# Patient Record
Sex: MALE | Race: WHITE | URBAN - METROPOLITAN AREA
[De-identification: names, ages, dates, MRNs, and addresses within clinical notes are randomized per-mention and may not be internally consistent; named-entity substitution may affect disease eponyms.]

---

## 2017-05-01 ENCOUNTER — OFFICE VISIT (OUTPATIENT)
Dept: DERMATOLOGY | Age: 82
End: 2017-05-01

## 2017-05-01 DIAGNOSIS — L57.0 AK (ACTINIC KERATOSIS): Primary | ICD-10-CM

## 2017-05-01 DIAGNOSIS — R23.8 VENOUS LAKE: ICD-10-CM

## 2017-05-01 DIAGNOSIS — L82.1 SEBORRHEIC KERATOSIS: ICD-10-CM

## 2017-05-01 DIAGNOSIS — D22.9 MULTIPLE NEVI: ICD-10-CM

## 2017-05-01 PROCEDURE — 17003 DESTRUCT PREMALG LES 2-14: CPT | Performed by: DERMATOLOGY

## 2017-05-01 PROCEDURE — 17000 DESTRUCT PREMALG LESION: CPT | Performed by: DERMATOLOGY

## 2017-05-01 PROCEDURE — 99213 OFFICE O/P EST LOW 20 MIN: CPT | Performed by: DERMATOLOGY

## 2017-05-01 RX ORDER — HYDROCODONE BITARTRATE AND ACETAMINOPHEN 5; 325 MG/1; MG/1
1 TABLET ORAL EVERY 6 HOURS PRN
COMMUNITY

## 2017-05-01 RX ORDER — CYCLOBENZAPRINE HCL 5 MG
5 TABLET ORAL 3 TIMES DAILY PRN
COMMUNITY

## 2018-01-08 ENCOUNTER — TELEPHONE (OUTPATIENT)
Dept: DERMATOLOGY | Age: 83
End: 2018-01-08

## 2018-01-08 NOTE — TELEPHONE ENCOUNTER
Patient called and needs to get in soon. He has white spots on is hands and they are itchy.     Call back # [phone]

## 2018-01-11 ENCOUNTER — OFFICE VISIT (OUTPATIENT)
Dept: DERMATOLOGY | Age: 83
End: 2018-01-11

## 2018-01-11 DIAGNOSIS — L57.0 AK (ACTINIC KERATOSIS): Primary | ICD-10-CM

## 2018-01-11 PROCEDURE — 17000 DESTRUCT PREMALG LESION: CPT | Performed by: DERMATOLOGY

## 2018-01-11 PROCEDURE — 17003 DESTRUCT PREMALG LES 2-14: CPT | Performed by: DERMATOLOGY

## 2018-01-11 NOTE — PROGRESS NOTES
Allergies      Physical Examination     Gen, NAD  The following were examined and determined to be normal: Psych/Neuro  The following were examined and determined to be abnormal: RUE, LUE  Dorsum of the hands with erythematous roughly scaled macules    Assessment and Plan     1. AK's - Dorsum of the hands  - 8 lesion(s) on the hands treated with liquid nitrogen x 2 cycles. Patient educated on risk of blister, hypopigmentation/scar and wound care. - Sun protection    Follow-up in May as planned for full skin check.

## 2018-05-07 ENCOUNTER — OFFICE VISIT (OUTPATIENT)
Dept: DERMATOLOGY | Age: 83
End: 2018-05-07

## 2018-05-07 DIAGNOSIS — L57.0 AK (ACTINIC KERATOSIS): Primary | ICD-10-CM

## 2018-05-07 DIAGNOSIS — D22.9 MULTIPLE NEVI: ICD-10-CM

## 2018-05-07 DIAGNOSIS — L82.1 SEBORRHEIC KERATOSIS: ICD-10-CM

## 2018-05-07 PROCEDURE — 99213 OFFICE O/P EST LOW 20 MIN: CPT | Performed by: DERMATOLOGY

## 2018-05-07 PROCEDURE — 17004 DESTROY PREMAL LESIONS 15/>: CPT | Performed by: DERMATOLOGY

## 2019-08-13 ENCOUNTER — OFFICE VISIT (OUTPATIENT)
Dept: DERMATOLOGY | Age: 84
End: 2019-08-13
Payer: COMMERCIAL

## 2019-08-13 DIAGNOSIS — D22.9 MULTIPLE NEVI: ICD-10-CM

## 2019-08-13 DIAGNOSIS — L82.1 SEBORRHEIC KERATOSIS: ICD-10-CM

## 2019-08-13 DIAGNOSIS — L57.0 AK (ACTINIC KERATOSIS): Primary | ICD-10-CM

## 2019-08-13 PROCEDURE — 17003 DESTRUCT PREMALG LES 2-14: CPT | Performed by: DERMATOLOGY

## 2019-08-13 PROCEDURE — 99213 OFFICE O/P EST LOW 20 MIN: CPT | Performed by: DERMATOLOGY

## 2019-08-13 PROCEDURE — 17000 DESTRUCT PREMALG LESION: CPT | Performed by: DERMATOLOGY

## 2020-08-13 ENCOUNTER — OFFICE VISIT (OUTPATIENT)
Dept: DERMATOLOGY | Age: 85
End: 2020-08-13
Payer: COMMERCIAL

## 2020-08-13 VITALS — TEMPERATURE: 97.3 F

## 2020-08-13 PROCEDURE — 17003 DESTRUCT PREMALG LES 2-14: CPT | Performed by: DERMATOLOGY

## 2020-08-13 PROCEDURE — 17000 DESTRUCT PREMALG LESION: CPT | Performed by: DERMATOLOGY

## 2020-08-13 PROCEDURE — 99213 OFFICE O/P EST LOW 20 MIN: CPT | Performed by: DERMATOLOGY

## 2020-08-13 NOTE — PROGRESS NOTES
[facility]  Aisha Delgado MD  [phone]      Annel Laurent  1935    82 y.o. male     Date of Visit: 8/13/2020    Chief Complaint: f/u AK's  Chief Complaint   Patient presents with    Skin Exam     Last seen: 8-2019    History of Present Illness:    *he has dystonia and MDS    1. Here for f/u for AK's. s/p LN2 to multiple lesions on the hands at last visit with imrpovement. He has multiple rough lesions on the face/scalp, cheeks, arms. s/p PDT for the scalp 9-2015 with 1 hour incubation. He had no probs with trx, had improvement but had several lesions remaining. No recent field trx. 2.  He has scattered asymptomatic brown lesions on the trunk and extremities. No other lesions have been changing or symptomatic/bleeding. No known skin cancers previously. He had PDT in the past once with Dr. Michael Howard. S/p bx of a lesion on the frontal scalp at previous visit - read as actinic keratosis. No family hx of melanoma. Previously saw Dr. Michael Howard. Review of Systems:  Gen: Feels well, good sense of health. Skin: No changing moles or lesions. Past Medical History, Family History, Surgical History, Medications and Allergies reviewed. Past Medical History:   Diagnosis Date    Seizure disorder ([address]     Sleep apnea     C-pap daily    Thrombocytopenia ([address]        Past Surgical History:   Procedure Laterality Date    APPENDECTOMY      TONSILLECTOMY         Outpatient Medications Marked as Taking for the 8/13/20 encounter (Office Visit) with Marie Dupree MD   Medication Sig Dispense Refill    levETIRAcetam (KEPPRA) 500 MG tablet Take 500 mg by mouth 2 times daily      Cholecalciferol (VITAMIN D3) 66674 UNITS CAPS Take  by mouth.  calcium carbonate (OSCAL) 500 MG TABS tablet Take 500 mg by mouth daily.  Multiple Vitamin (MULTIVITAMINS PO) Take  by mouth.       amphetamine-dextroamphetamine (ADDERALL XR) 15 MG XR capsule   Take 7.5 mg by mouth every morning          No Known Allergies      Physical Examination     Gen, NAD  The following were examined and determined to be normal: Psych/Neuro, Conjunctivae/eyelids, Gums/teeth/lips, Neck, Breast/axilla/chest, Abdomen, Back, RLE, LLE, Nails/digits and buttocks. The following were examined and determined to be abnormal: Scalp/hair and Head/face. LUE, RUE    trunk and extremities with scattered brown macules and papules  scalp, cheeks, R hand with erythematous roughly scaled macules    Assessment and Plan     1. AK's - scalp, cheeks, R hand  - 12 lesion(s) on the above areas treated with liquid nitrogen x 2 cycles. Patient educated on risk of blister, hypopigmentation/scar and wound care. - educ sun protection   - encouraged skin check yearly (sooner if indicated), self checks    2. Benign-appearing nevi and SKs  - educ re ABCD's of MM   educ sun protection   encouraged skin check yearly (sooner if indicated), self checks     F/u 1 year.

## 2022-01-18 ENCOUNTER — OFFICE VISIT (OUTPATIENT)
Dept: DERMATOLOGY | Age: 87
End: 2022-01-18
Payer: COMMERCIAL

## 2022-01-18 VITALS — TEMPERATURE: 97.3 F

## 2022-01-18 DIAGNOSIS — L57.0 AK (ACTINIC KERATOSIS): Primary | ICD-10-CM

## 2022-01-18 DIAGNOSIS — L82.1 SEBORRHEIC KERATOSIS: ICD-10-CM

## 2022-01-18 DIAGNOSIS — D22.9 MULTIPLE NEVI: ICD-10-CM

## 2022-01-18 PROCEDURE — 99213 OFFICE O/P EST LOW 20 MIN: CPT | Performed by: DERMATOLOGY

## 2022-01-18 PROCEDURE — 17004 DESTROY PREMAL LESIONS 15/>: CPT | Performed by: DERMATOLOGY

## 2022-01-18 RX ORDER — ACETAMINOPHEN 325 MG/1
650 TABLET ORAL EVERY 6 HOURS PRN
COMMUNITY

## 2022-01-18 RX ORDER — OXYMETAZOLINE HYDROCHLORIDE 0.05 G/100ML
SPRAY NASAL
COMMUNITY

## 2022-01-18 NOTE — PROGRESS NOTES
6 hours as needed for Pain      levETIRAcetam (KEPPRA) 500 MG tablet Take 500 mg by mouth 2 times daily      Cholecalciferol (VITAMIN D3) 29021 UNITS CAPS Take  by mouth.  calcium carbonate (OSCAL) 500 MG TABS tablet Take 500 mg by mouth daily.  Multiple Vitamin (MULTIVITAMINS PO) Take  by mouth.  amphetamine-dextroamphetamine (ADDERALL XR) 15 MG XR capsule   Take 7.5 mg by mouth every morning          No Known Allergies      Physical Examination     Gen, NAD  FSE today    trunk and extremities with scattered brown macules and papules  scalp, cheeks, dorsum hands and L thumb with erythematous roughly scaled macules    Assessment and Plan     1. AK's - scalp, cheeks, hands, L thumb  - 15 lesion(s) on the above areas treated with liquid nitrogen x 2 cycles. Patient educated on risk of blister, hypopigmentation/scar and wound care. - educ sun protection SPF 30+  - encouraged skin check yearly (sooner if indicated), self checks    2. Benign-appearing nevi and SKs  - educ re ABCD's of MM   educ sun protection SPF 30+  encouraged skin check yearly (sooner if indicated), self checks     F/u 1 year.

## 2023-01-09 ENCOUNTER — OFFICE VISIT (OUTPATIENT)
Dept: DERMATOLOGY | Age: 88
End: 2023-01-09
Payer: COMMERCIAL

## 2023-01-09 DIAGNOSIS — D22.9 MULTIPLE NEVI: ICD-10-CM

## 2023-01-09 DIAGNOSIS — L82.1 SEBORRHEIC KERATOSIS: ICD-10-CM

## 2023-01-09 DIAGNOSIS — D48.5 NEOPLASM OF UNCERTAIN BEHAVIOR OF SKIN: ICD-10-CM

## 2023-01-09 DIAGNOSIS — L57.0 AK (ACTINIC KERATOSIS): Primary | ICD-10-CM

## 2023-01-09 PROCEDURE — 99213 OFFICE O/P EST LOW 20 MIN: CPT | Performed by: DERMATOLOGY

## 2023-01-09 PROCEDURE — 11102 TANGNTL BX SKIN SINGLE LES: CPT | Performed by: DERMATOLOGY

## 2023-01-09 PROCEDURE — 1123F ACP DISCUSS/DSCN MKR DOCD: CPT | Performed by: DERMATOLOGY

## 2023-01-09 PROCEDURE — 17004 DESTROY PREMAL LESIONS 15/>: CPT | Performed by: DERMATOLOGY

## 2023-01-09 NOTE — PROGRESS NOTES
[facility]  Edwina Saravia MD  [phone]      Vito Lepe  1935    82 y.o. male     Date of Visit: 1/9/2023    Chief Complaint: f/u AK's, lesions  Chief Complaint   Patient presents with    Skin Exam     Last seen: 1-2022    History of Present Illness:    *he has dystonia and MDS    1. Here for f/u for AK's. s/p LN2 to multiple lesions on the hands at last visit with imrpovement. He has multiple rough lesions on the face/scalp, arms and hands. s/p PDT for the scalp 9-2015 with 1 hour incubation. He had no probs with trx, had improvement but had several lesions remaining. No recent field trx. 2.  He has scattered asymptomatic brown lesions on the trunk and extremities. No other lesions have been changing or symptomatic/bleeding. 3. He has a concerning pink lesion on the L elbow. Asx. No known skin cancers previously. He had PDT in the past once with Dr. Kevin Sheehan. S/p bx of a lesion on the frontal scalp at previous visit - read as actinic keratosis. No family hx of melanoma. Previously saw Dr. Kevin Sheehan. Review of Systems:  Gen: Feels well, good sense of health. Skin: No changing moles or lesions. Past Medical History, Family History, Surgical History, Medications and Allergies reviewed.     Past Medical History:   Diagnosis Date    Seizure disorder ([address]     Sleep apnea     C-pap daily    Thrombocytopenia (HCC)        Past Surgical History:   Procedure Laterality Date    APPENDECTOMY      TONSILLECTOMY         Outpatient Medications Marked as Taking for the 1/9/23 encounter (Office Visit) with Aubree Mcgowan MD   Medication Sig Dispense Refill    oxymetazoline (AFRIN) 0.05 % nasal spray by Nasal route      cyanocobalamin 1000 MCG tablet Take 2,000 mcg by mouth daily      sodium chloride (OCEAN, BABY AYR) 0.65 % nasal spray 1 spray by Nasal route as needed for Congestion      acetaminophen (TYLENOL) 325 MG tablet Take 650 mg by mouth every 6 hours as needed for Pain      HYDROcodone-acetaminophen (NORCO) 5-325 MG per tablet Take 1 tablet by mouth every 6 hours as needed for Pain. cyclobenzaprine (FLEXERIL) 5 MG tablet Take 5 mg by mouth 3 times daily as needed for Muscle spasms      levETIRAcetam (KEPPRA) 500 MG tablet Take 500 mg by mouth 2 times daily      risedronate (ACTONEL) 35 MG tablet Take 35 mg by mouth every 7 days      Cholecalciferol (VITAMIN D3) 72110 UNITS CAPS Take  by mouth.      calcium carbonate (OSCAL) 500 MG TABS tablet Take 500 mg by mouth daily. Glucosamine 500 MG CAPS Take by mouth      Multiple Vitamin (MULTIVITAMINS PO) Take  by mouth. amphetamine-dextroamphetamine (ADDERALL XR) 15 MG XR capsule   Take 7.5 mg by mouth every morning          No Known Allergies      Physical Examination     Gen, NAD  FSE today    trunk and extremities with scattered brown macules and papules  scalp, cheeks, dorsum hands and hands with erythematous roughly scaled macules  L elbow with bright pink oval macule    Assessment and Plan     1. AK's - scalp, cheeks, hands/arms  - 16  lesion(s) treated with liquid nitrogen with cryac or swab. Treated with 2 cycles for 1-5 seconds each after consent from patient. Patient educated on risk of blister, hypopigmentation/scar and wound care. Tolerated well. - educ sun protection SPF 30+  - encouraged skin check yearly (sooner if indicated), self checks    2. Benign-appearing nevi and SKs  - Monitor for ABCD's of MM and si/sx of NMSC  Continue sun protection - OTC sunscreen with SPF 30-50+ recommended and reviewed usage  Encouraged skin check yearly (sooner if indicated), self checks    3. L elbow - r/o BCC  - Shave biopsy performed after verbal consent obtained. Patient educated regarding risk of bleeding, infection, scar and educated on wound care. Skin cleansed with alcohol pad and site anesthetized with lido + epi. Aluminum chloride applied to site for hemostasis.   Petrolatum ointment and bandage applied. Specimen bottle labeled with patient information and site and specimen sent to [facility]. F/u 1 year.

## 2023-01-09 NOTE — PATIENT INSTRUCTIONS

## 2023-01-11 LAB — DERMATOLOGY PATHOLOGY REPORT: ABNORMAL

## 2023-01-12 ENCOUNTER — TELEPHONE (OUTPATIENT)
Dept: DERMATOLOGY | Age: 88
End: 2023-01-12

## 2023-10-23 ENCOUNTER — OFFICE VISIT (OUTPATIENT)
Dept: DERMATOLOGY | Age: 88
End: 2023-10-23
Payer: COMMERCIAL

## 2023-10-23 DIAGNOSIS — Z85.828 HISTORY OF NONMELANOMA SKIN CANCER: ICD-10-CM

## 2023-10-23 DIAGNOSIS — L82.1 SEBORRHEIC KERATOSIS: ICD-10-CM

## 2023-10-23 DIAGNOSIS — L57.0 AK (ACTINIC KERATOSIS): Primary | ICD-10-CM

## 2023-10-23 DIAGNOSIS — D22.9 MULTIPLE NEVI: ICD-10-CM

## 2023-10-23 PROCEDURE — 17000 DESTRUCT PREMALG LESION: CPT | Performed by: DERMATOLOGY

## 2023-10-23 PROCEDURE — 1123F ACP DISCUSS/DSCN MKR DOCD: CPT | Performed by: DERMATOLOGY

## 2023-10-23 PROCEDURE — 17003 DESTRUCT PREMALG LES 2-14: CPT | Performed by: DERMATOLOGY

## 2023-10-23 PROCEDURE — 99213 OFFICE O/P EST LOW 20 MIN: CPT | Performed by: DERMATOLOGY

## 2023-10-23 NOTE — PROGRESS NOTES
tablets by mouth every 6 hours as needed for Pain      levETIRAcetam (KEPPRA) 500 MG tablet Take 1 tablet by mouth 2 times daily      risedronate (ACTONEL) 35 MG tablet Take 1 tablet by mouth every 7 days      Cholecalciferol (VITAMIN D3) 16123 UNITS CAPS Take  by mouth.      calcium carbonate (OSCAL) 500 MG TABS tablet Take 1 tablet by mouth daily      Multiple Vitamin (MULTIVITAMINS PO) Take  by mouth. amphetamine-dextroamphetamine (ADDERALL XR) 15 MG XR capsule   Take 7.5 mg by mouth every morning          No Known Allergies      Physical Examination     Gen, NAD  FSE today except for underwear-covered areas    trunk and extremities with scattered brown macules and papules  cheeks, dorsum hands, R jawline with erythematous roughly scaled macules  L elbow with scar - clear    Assessment and Plan     1. AK's - cheeks, hands/arms, R jawline  - 8  lesion(s) treated with liquid nitrogen with cryac or swab. Treated with 2 cycles for 1-5 seconds each after consent from patient. Patient educated on risk of blister, hypopigmentation/scar and wound care. Tolerated well. - educ sun protection SPF 30+  - scalp fairly clear currently  - encouraged skin check yearly (sooner if indicated), self checks    2. Benign-appearing nevi and SKs  - Monitor for ABCD's of MM and si/sx of NMSC  Continue sun protection - OTC sunscreen with SPF 30-50+ recommended and reviewed usage  Encouraged skin check yearly (sooner if indicated), self checks    3. 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

## 2023-12-27 ENCOUNTER — TELEPHONE (OUTPATIENT)
Dept: DERMATOLOGY | Age: 88
End: 2023-12-27

## 2023-12-27 NOTE — TELEPHONE ENCOUNTER
Sensitive part on his scalp that is getting bigger would like an appt ASAP if possible. PHONE [phone]

## 2024-01-11 ENCOUNTER — OFFICE VISIT (OUTPATIENT)
Dept: DERMATOLOGY | Age: 89
End: 2024-01-11

## 2024-01-11 DIAGNOSIS — D48.5 NEOPLASM OF UNCERTAIN BEHAVIOR OF SKIN: Primary | ICD-10-CM

## 2024-01-11 NOTE — PATIENT INSTRUCTIONS

## 2024-01-11 NOTE — PROGRESS NOTES
[facility]  Jeanne Posey MD  [phone]      Cruz Barcenas  1935    88 y.o. male     Date of Visit: 1/11/2024    Chief Complaint: f/u AK's, lesions  Chief Complaint   Patient presents with    Skin Lesion     Last seen:  for FSE    History of Present Illness:    *he has dystonia and MDS    Here for tender lesion on the scalp - first noticed 12/24/23 and growing.  Leaving town for [place] 2/8/24 for 3 weeks.      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    He had PDT in the past once with Dr. Sal.  S/p bx of a lesion on the frontal scalp at previous visit - read as actinic keratosis.    No family hx of melanoma.  Previously saw Dr. Sal.    Review of Systems:  Gen: Feels well, good sense of health.      Past Medical History, Family History, Surgical History, Medications and Allergies reviewed.    Past Medical History:   Diagnosis Date    Seizure disorder (HCC)     Sleep apnea     C-pap daily    Thrombocytopenia (HCC)        Past Surgical History:   Procedure Laterality Date    APPENDECTOMY      TONSILLECTOMY         Outpatient Medications Marked as Taking for the 1/11/24 encounter (Office Visit) with Jeanne Posey MD   Medication Sig Dispense Refill    oxymetazoline (AFRIN) 0.05 % nasal spray by Nasal route      cyanocobalamin 1000 MCG tablet Take 2 tablets by mouth daily      sodium chloride (OCEAN, BABY AYR) 0.65 % nasal spray 1 spray by Nasal route as needed for Congestion      acetaminophen (TYLENOL) 325 MG tablet Take 2 tablets by mouth every 6 hours as needed for Pain      levETIRAcetam (KEPPRA) 500 MG tablet Take 1 tablet by mouth 2 times daily      risedronate (ACTONEL) 35 MG tablet Take 1 tablet by mouth every 7 days      Cholecalciferol (VITAMIN D3) 58582 UNITS CAPS Take  by mouth.      calcium carbonate (OSCAL) 500 MG TABS tablet Take 1 tablet by mouth daily      Multiple Vitamin (MULTIVITAMINS PO) Take  by

## 2024-01-17 ENCOUNTER — TELEPHONE (OUTPATIENT)
Dept: DERMATOLOGY | Age: 89
End: 2024-01-17

## 2024-01-17 DIAGNOSIS — C44.42 SCC (SQUAMOUS CELL CARCINOMA), SCALP/NECK: Primary | ICD-10-CM

## 2024-01-17 NOTE — TELEPHONE ENCOUNTER
Scalp - SCC.  To Dr. ALESSIO Gonsalez for Mohs.    Please call up to [facility] and see if there is any window to get him in [facility].  This grew very quickly AND he's scheduled to leave town on 2/8.  Let me know when they can get him in.

## 2024-01-17 NOTE — TELEPHONE ENCOUNTER
The patient has been notified of this information and all questions answered.     Referral sent to [facility] for patient scheduling possibly before leaving [facility] on 2/8/2024.    I will speak with [facility] department 1/18/2024 and return patent's call with status or alternative [facility] physicians to treat patient.

## 2024-01-18 NOTE — TELEPHONE ENCOUNTER
Spoke to gabby in [facility] department and cancellation for 1/22/2024 may be an option for patient, if approved by the physician.    Patient will be contacted and offered appointment if available?     Patient scheduled for 1/22/2024 @ 8AM.

## 2024-01-22 ENCOUNTER — PROCEDURE VISIT (OUTPATIENT)
Dept: SURGERY | Age: 89
End: 2024-01-22
Payer: MEDICARE

## 2024-01-22 ENCOUNTER — TELEPHONE (OUTPATIENT)
Dept: SURGERY | Age: 89
End: 2024-01-22

## 2024-01-22 ENCOUNTER — PROCEDURE VISIT (OUTPATIENT)
Dept: SURGERY | Age: 89
End: 2024-01-22

## 2024-01-22 VITALS — SYSTOLIC BLOOD PRESSURE: 133 MMHG | HEART RATE: 77 BPM | DIASTOLIC BLOOD PRESSURE: 69 MMHG

## 2024-01-22 DIAGNOSIS — C44.42 SQUAMOUS CELL CARCINOMA OF SKIN OF SCALP: Primary | ICD-10-CM

## 2024-01-22 DIAGNOSIS — C44.42 SQUAMOUS CELL CARCINOMA, SCALP/NECK: Primary | ICD-10-CM

## 2024-01-22 PROBLEM — G40.909 SEIZURE DISORDER (HCC): Status: ACTIVE | Noted: 2021-07-08

## 2024-01-22 PROBLEM — I49.02 VENTRICULAR FLUTTER (HCC): Status: ACTIVE | Noted: 2023-09-22

## 2024-01-22 PROBLEM — D69.42: Status: ACTIVE | Noted: 2021-07-08

## 2024-01-22 PROBLEM — I47.29 NONSUSTAINED VENTRICULAR TACHYCARDIA (HCC): Status: ACTIVE | Noted: 2021-12-21

## 2024-01-22 PROCEDURE — 1123F ACP DISCUSS/DSCN MKR DOCD: CPT | Performed by: DERMATOLOGY

## 2024-01-22 PROCEDURE — 99214 OFFICE O/P EST MOD 30 MIN: CPT | Performed by: DERMATOLOGY

## 2024-01-22 RX ORDER — AZACITIDINE 100 MG/1
75 INJECTION, POWDER, LYOPHILIZED, FOR SOLUTION INTRAVENOUS; SUBCUTANEOUS
COMMUNITY

## 2024-01-22 NOTE — PROGRESS NOTES
medications.     Repair options discussed in detail and illustrated for the patient either with a drawing or representative photos of similar reconstructions in other cases.  I informed the patient that based on size and location the following would be the most likely options, although this could change depending on the final size and shape of the defect following cancer extirpation:    Intermediate layered closure     Risks of surgery were discussed, including but not limited to bleeding, infection, sensory nerve damage, motor nerve damage, numbness, tingling, pain, need for further procedures including scar revision surgery or other scar improvement procedures, referral to other specialties for additional procedures, cosmetic outcome that does not align with patient expectation.      All questions answered in regards to the need for surgery and the possible reconstructive modalities necessary.     High risk tumor characteristics that complicate surgery or post-operative healing (patient or procedure risk factors including high risk squamous cell carcinoma and the necessity of flap or graft reconstruction):  no  Co-morbidities increasing the risk of surgical complications, mortality or morbidity: (I.e. immunosuppression, diabetes, anticoagulant therapy, pacemaker/defibrillator, prosthetic joint, tobacco use):  yes - pt has platelets below 60.  We discussed that platelets need to be 60 or greater to perform surgery safely without undue risk of post op bleeding.     OTC Medication recommendations:  Acetaminophen and or ibuprofen for post-procedure pain if cleared by PCP and permissible with patients known co-morbidities  Patient currently on Anticoagulant or other medications that may impact surgery:  yes - pt will need to d/w hematologist best way to elevate platelet count so that >60 on day of surgery. Scheduled surgery for 2/5.  He will call to confirm that he has set this up and if I need to discuss with

## 2024-01-22 NOTE — TELEPHONE ENCOUNTER
[facility] stating he will be having his platelet infusion performed at the surgery center at [facility] on Monday Feb 5th at 8:00am so he should be available to have his surgery done as Dr FENTON instructed on Feb 5th as well at 11:00am.

## 2024-02-05 ENCOUNTER — TELEPHONE (OUTPATIENT)
Dept: SURGERY | Age: 89
End: 2024-02-05

## 2024-02-05 NOTE — TELEPHONE ENCOUNTER
I called pt to discuss rescheduling his appt and to offer him a date possibly this Wed but after speaking with him regarding him traveling 2/8 via air until 3/2; he decided to reschedule Mohs appt and the date is 3/12 at 11.  He's asking for a new order for platelets to be faxed to the nurse noted below with the level that they need to be, 60,000 or higher which was noted by Dr. Gonsalez to pt.      Dr. Craft at [facility], (contact number for oncology office [phone])  is the patient's oncologist and his nurse, Joselo Luong ph#: [phone] ext 3 option 2, option 2 again.      Please have order faxed over to their office for them to arrange this infusion.     Pt has been rescheduled for Mohs on 3/12 at 11 a.m.

## 2024-02-22 NOTE — TELEPHONE ENCOUNTER
At 3:03 p.m. spoke to Joselo, Nurse Navigator at [facility] hematology/oncology for Dr. Geoffrey Gonzalez.    Joselo put patient on schedule for 3/12/24 at 8:00 a.m. at the [facility] for platelet infusion in order to be able to arrive to [facility] for 11:00 a.m. appointment.  Joselo stated he would close the loop by calling the patient to inform him of the infusion appointment.  Joselo stated the patient's platelets are usually around 50 K after the infusion.    No further clinical action required at this time.

## 2024-02-22 NOTE — TELEPHONE ENCOUNTER
I have not seen any action on this request from 2/5.  Please have an order sent as pt has requested.

## 2024-03-12 ENCOUNTER — PROCEDURE VISIT (OUTPATIENT)
Dept: SURGERY | Age: 89
End: 2024-03-12
Payer: MEDICARE

## 2024-03-12 VITALS — HEART RATE: 79 BPM | DIASTOLIC BLOOD PRESSURE: 68 MMHG | SYSTOLIC BLOOD PRESSURE: 134 MMHG

## 2024-03-12 DIAGNOSIS — C44.42 SQUAMOUS CELL CARCINOMA OF SKIN OF SCALP: Primary | ICD-10-CM

## 2024-03-12 PROCEDURE — 17311 MOHS 1 STAGE H/N/HF/G: CPT | Performed by: DERMATOLOGY

## 2024-03-12 PROCEDURE — 12032 INTMD RPR S/A/T/EXT 2.6-7.5: CPT | Performed by: DERMATOLOGY

## 2024-03-12 NOTE — PROGRESS NOTES
PRE-PROCEDURE SCREENING    Pacemaker/ICD: No  Difficulty with numbing in the past: No  Local Anesthesia Reaction/passing out: No  Latex or adhesive allergy:  No  Bleeding/Clotting Disorders: Pt states he has low platelets  Anticoagulant Therapy: No  Joint prosthesis: No  Artificial Heart Valve: No  Stroke or Seizures: Yes, hx of seizures  Organ Transplant or Lymphoma: No  Immunosuppression: Yes, pt on chemotherapy d/t MSD  Respiratory Problems: No  
carefully defined and debulked, determining the edge of the surgical excision.    A thin layer of tumor-laden tissue was excised with a narrow margin of normal-appearing skin, using the technique of Mohs.  A map was prepared to correspond to the area of skin from which it was excised.    Hemostasis was achieved using electrosurgery.  The wound was bandaged.     The tissue was prepared for the cryostat and sectioned. 1 section(s) prepared. Each section was coded, cut, and stained for microscopic examination. The entire base and margins of the excised piece of tissue were examined by the surgeon.  The tissue was examined to the level of subcutaneous fat.    No tumor was identified at the peripheral margins of stage I of microscopically controlled surgery.          DEFECT MANAGEMENT:    REPAIR DESCRIPTION:  Various closure modalities were discussed with the patient, and it was decided that an intermediate layered repair would best preserve normal anatomic and functional relationships. Additional risk of wound dehiscence was discussed.     The area was anesthetized with 1% lidocaine with epinephrine 1:100,000 buffered, was given a sterile prep using Chlorhexidine gluconate 4% solution and draped in the usual sterile fashion. Recreation and enlargement of the wound was performed by excising cones of tissue via the triangulation technique.    The final incision lines were placed with respect for the patient's natural skin tension lines in a linear configuration to avoid functional and aesthetic distortion of adjacent free margins. Following minimal undermining, meticulous hemostasis was obtained with spot monopolar electrocoagulation.  Subcutaneous dead space and dermis were closed using 4-0 Vicryl buried subcutaneous interrupted suture and the epidermis was approximated with 4-0 Prolene interrupted epidermal sutures.         WOUND COVERAGE:  The wound was cleaned with normal saline solution, dried off, Aquaphor ointment

## 2024-03-12 NOTE — PATIENT INSTRUCTIONS
[facility] Office Hours:    Monday-Thursday  7:30 AM-4:30 PM    Friday  9:00 AM-1:00 PM      POST-OPERATIVE CARE FOR STITCHES  Bandage change after 48 hours    CARING FOR YOUR SURGICAL SITE  The bandage should remain on and completely dry for 48 hours. Do NOT get the bandage wet.  After the first 48 hours, gently remove the remaining part of the bandage. It can be helpful to moisten the bandage edges in the shower. Steri strips may still be on the wound. It is ok, they will fall off slowly with the daily bandage changes.  Gently clean the wound daily with mild soap and water. Try to clean off crust and debris.   Dry (pat) the area with a clean Q-tip or gauze.   Apply a layer of Vaseline/ Aquaphor (or Bacitracin if your doctor recommends) to the wound area only.  Cut a piece of Telfa (or any non-stick dressing) to fit just over the wound and secure it with paper tape. If the wound is small you may use a Band- Aid. Keep area covered for a total of 3 week(s).  If the dressing comes off or if you have questions, or concerns about the dressing, please call the office for instructions!    POST OPERATIVE INSTRUCTIONS    Activity: Do not lift anything heavier than a gallon of milk for 1 week. Also, avoid strenuous activity such as running, power walking or contact sports.  Eating and drinking: Do not drink alcohol for 48 hours after your procedure. Alcohol increases the chances of bleeding.  Medicines   -If you have discomfort, take Acetaminophen (Tylenol or Extra Strength Tylenol). Follow the instructions and warning on the bottle.  -If your doctor has prescribed you an Aspirin daily, please keep taking it. Do not take extra Aspirin or medicines containing Aspirin (such as [place] and Excedrin) for 48 hours after your procedure.    Bleeding: If bleeding occurs, DO NOT remove the bandage. Put firm pressure on the area with gauze for 20 minutes without peeking. If the bleeding continues, apply

## 2024-03-13 ENCOUNTER — TELEPHONE (OUTPATIENT)
Dept: SURGERY | Age: 89
End: 2024-03-13

## 2024-03-13 NOTE — TELEPHONE ENCOUNTER
The patient was in the office on 3/12/24 for mohs located on the right vertex scalp with ILC repair.  The patient tolerated the procedure well and left the office in good condition.    Pain level on post-operative day 1:  PT has had some pain and he is managing with Tylenol.  No concerns at this time    Any bleeding episode that required pressure to be held, bandage change or a call to the office or MD?  no     Any other issues?:  no    A post-operative telephone call was placed at 2:00 pm in order to check on the patient's recovery process.  The patient reported doing well and had no complaints other than those listed above, if any.  All of the patient's questions were answered.

## 2024-04-02 ENCOUNTER — NURSE ONLY (OUTPATIENT)
Dept: SURGERY | Age: 89
End: 2024-04-02

## 2024-04-02 DIAGNOSIS — Z48.02 VISIT FOR SUTURE REMOVAL: Primary | ICD-10-CM

## 2024-04-02 NOTE — PATIENT INSTRUCTIONS
[facility] Office Hours:    Monday-Thursday  7:30 AM-4:30 PM    Friday  9:00 AM-1:00 PM     WOUND CARE AFTER SUTURE REMOVAL    After your stiches have been removed, your scar is still very fragile. In fact, scars continue to change and evolve, what we call remodel, for about a year after your procedure.  Follow the following steps below to ensure that your scar heals well.    Instructions    1. If Steri-strips were applied, keep them on until they fall off on their own.  2. Protect your scar from the sun. Use a sunscreen or bandage to cover your scar. Sun exposure can cause your scar to become discolored and appear red or brown.  3. To help soften your scar more rapidly, it is helpful, but not necessary, for you to   massage the scar gently each night for twenty minutes.   4. “Spitting” suture. Occasionally, an inside suture (stitch) does not completely dissolve. When this happens, (generally 4-8 weeks after surgery), it causes a bump or “pimple” to form on the scar. This is easily removed and is not at all serious. It   does not mean the skin cancer has returned. Contact us if it happens, but do not be alarmed.      5. If you scar becomes tender, itchy or becomes very large, let Dr. Gonsalez know.  There    are treatments that can improve the appearance of your scar or help make it more comfortable.

## 2024-07-11 ENCOUNTER — OFFICE VISIT (OUTPATIENT)
Dept: DERMATOLOGY | Age: 89
End: 2024-07-11

## 2024-07-11 DIAGNOSIS — D22.9 MULTIPLE NEVI: ICD-10-CM

## 2024-07-11 DIAGNOSIS — L57.0 AK (ACTINIC KERATOSIS): Primary | ICD-10-CM

## 2024-07-11 DIAGNOSIS — Z85.828 HISTORY OF NONMELANOMA SKIN CANCER: ICD-10-CM

## 2024-07-11 DIAGNOSIS — L82.1 SEBORRHEIC KERATOSIS: ICD-10-CM

## 2024-07-11 RX ORDER — FOLIC ACID 1 MG/1
1 TABLET ORAL DAILY
COMMUNITY
Start: 2024-01-22

## 2024-07-11 NOTE — PROGRESS NOTES
[facility]  Jeanne Posey MD  [phone]      Cruz Barcenas  1935    88 y.o. male     Date of Visit: 7/11/2024    Chief Complaint: f/u AK's, lesions  Chief Complaint   Patient presents with    Skin Exam     Last seen:  and 1-2024 for SCC    History of Present Illness:    *he has dystonia and MDS    1. Here for f/u for AK's.  s/p LN2 to multiple lesions on the hands at last visit with imrpovement.  He has multiple rough lesions on the face, arms and hands.  s/p PDT for the scalp 9-2015 with 1 hour incubation.  He had no probs with trx, had improvement but had several lesions remaining.  No recent field trx.    2.  He has scattered asymptomatic brown lesions on the trunk and extremities.  No other lesions have been changing or symptomatic/bleeding.    3. F/u [facility].   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  No probs since trx.    He had PDT in the past once with Dr. Sal.  S/p bx of a lesion on the frontal scalp at previous visit - read as actinic keratosis.    No family hx of melanoma.  Previously saw Dr. Sal.    Review of Systems:  Gen: Feels well, good sense of health.  Skin: No changing moles or lesions.    Past Medical History, Family History, Surgical History, Medications and Allergies reviewed.    Past Medical History:   Diagnosis Date    Seizure disorder (HCC)     Sleep apnea     C-pap daily    Thrombocytopenia (HCC)        Past Surgical History:   Procedure Laterality Date    APPENDECTOMY      MOHS SURGERY  01/22/2024    SCC right vertex scalp by Dr. Jeanne Gonsalez    TONSILLECTOMY         Outpatient Medications Marked as Taking for the 7/11/24 encounter (Office Visit) with Jeanne Posey MD   Medication Sig Dispense Refill    folic acid (FOLVITE) 1 MG tablet Take 1 tablet by mouth daily      azaCITIDine (VIDAZA) 100 MG chemo injection

## 2025-02-20 ENCOUNTER — OFFICE VISIT (OUTPATIENT)
Age: 89
End: 2025-02-20
Payer: MEDICARE

## 2025-02-20 DIAGNOSIS — Z85.828 HISTORY OF NONMELANOMA SKIN CANCER: ICD-10-CM

## 2025-02-20 DIAGNOSIS — L82.1 SEBORRHEIC KERATOSIS: ICD-10-CM

## 2025-02-20 DIAGNOSIS — D22.9 MULTIPLE NEVI: ICD-10-CM

## 2025-02-20 DIAGNOSIS — L57.0 AK (ACTINIC KERATOSIS): Primary | ICD-10-CM

## 2025-02-20 DIAGNOSIS — D48.5 NEOPLASM OF UNCERTAIN BEHAVIOR OF SKIN: ICD-10-CM

## 2025-02-20 PROCEDURE — 1159F MED LIST DOCD IN RCRD: CPT | Performed by: DERMATOLOGY

## 2025-02-20 PROCEDURE — 1123F ACP DISCUSS/DSCN MKR DOCD: CPT | Performed by: DERMATOLOGY

## 2025-02-20 PROCEDURE — 99213 OFFICE O/P EST LOW 20 MIN: CPT | Performed by: DERMATOLOGY

## 2025-02-20 PROCEDURE — 1160F RVW MEDS BY RX/DR IN RCRD: CPT | Performed by: DERMATOLOGY

## 2025-02-20 PROCEDURE — 17004 DESTROY PREMAL LESIONS 15/>: CPT | Performed by: DERMATOLOGY

## 2025-02-20 PROCEDURE — 11102 TANGNTL BX SKIN SINGLE LES: CPT | Performed by: DERMATOLOGY

## 2025-02-20 NOTE — PATIENT INSTRUCTIONS
Biopsy Wound Care Instructions    Keep the bandage in place for 24 hours.   Cleanse the wound with mild soapy water daily  Gently dry the area.  Apply Vaseline or petroleum jelly to the wound using a cotton tipped applicator.  Cover with a clean bandage.  Repeat this process until the biopsy site is healed.  If you had stitches placed, continue treating the site until the stitches are removed. Remember to make an appointment to return to have your stitches removed by our staff.  You may shower and bathe as usual.       ** Biopsy results generally take around 7 business days to come back.  If you have not heard from us by then, please call the office at [phone].    *Please note that biopsy results are released to both the patient and physician at the same time in [facility].  Please allow time for your physician to review the results.  One of our staff members will reach out to you with the results and plan.         Cryosurgery (Freezing) Wound Care Instructions    AFTER THE PROCEDURE:   You will notice swelling and redness around the site. This is normal.   You may experience a sharp or sore feeling for the next several days. For this discomfort, you may take acetaminophen (Tylenol©).   A blister may develop at the treated area, sometimes as soon as by the end of the day. After several days, the blister will subside and a scab will form.   If the area is bumped or traumatized during the first few days following freezing, you may develop bleeding into the blister, forming a blood blister. This is nothing to be alarmed about.  If the blister is tense, uncomfortable, or much larger than the site that was frozen, you may pop the blister along its edge with a sterile needle (boiled, heated under a flame, or cleaned with alcohol) to allow the fluid to drain out. If the blister does not bother you, no treatment is needed.   Do NOT peel off the top of the blister roof. It will act as a dressing on top of your wound.

## 2025-02-20 NOTE — PROGRESS NOTES
[facility]  Jeanne Posey MD  [phone]      Cruz Barcenas  1935    89 y.o. male     Date of Visit: 2/20/2025    Chief Complaint: f/u AK's, lesions  Chief Complaint   Patient presents with    Skin Exam     Last seen: 7-2024    History of Present Illness:    *he has dystonia and MDS    1. Here for f/u for AK's.  s/p LN2 to multiple lesions on the hands at last visit with imrpovement.  He has scattered rough lesions on the face, arms and hands and few on the vertex of the scalp.  All small and asymptomatic  s/p PDT for the scalp 9-2015 with 1 hour incubation.  He had no probs with trx, had improvement but had several lesions remaining.  No recent field trx.    2.  He has scattered asymptomatic brown lesions on the trunk and extremities.  No other lesions have been changing or symptomatic/bleeding.    3. F/u [facility].   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  No probs since trx.    4.  He has a persistent hyperkeratotic lesion on the right hand.  Denies pain but recurrent after cryotherapy.    He had PDT in the past once with Dr. Sal.  S/p bx of a lesion on the frontal scalp at previous visit - read as actinic keratosis.    No family hx of melanoma.  Previously saw Dr. Sal.    Review of Systems:  Gen: Feels well, good sense of health.  Skin: No changing moles or lesions.    Past Medical History, Family History, Surgical History, Medications and Allergies reviewed.    Past Medical History:   Diagnosis Date    Seizure disorder (HCC)     Sleep apnea     C-pap daily    Thrombocytopenia        Past Surgical History:   Procedure Laterality Date    APPENDECTOMY      MOHS SURGERY  01/22/2024    SCC right vertex scalp by Dr. Jeanne Gonsalez    TONSILLECTOMY         Outpatient Medications Marked as Taking for the 2/20/25 encounter (Office Visit) with Jeanne Posey

## 2025-02-24 LAB — DERMATOLOGY PATHOLOGY REPORT: NORMAL

## 2025-08-12 ENCOUNTER — OFFICE VISIT (OUTPATIENT)
Age: 89
End: 2025-08-12

## 2025-08-12 DIAGNOSIS — B35.1 ONYCHOMYCOSIS: ICD-10-CM

## 2025-08-12 DIAGNOSIS — B35.4 TINEA CORPORIS: ICD-10-CM

## 2025-08-12 DIAGNOSIS — Z85.828 HISTORY OF NONMELANOMA SKIN CANCER: ICD-10-CM

## 2025-08-12 DIAGNOSIS — D22.9 MULTIPLE NEVI: ICD-10-CM

## 2025-08-12 DIAGNOSIS — L82.1 SEBORRHEIC KERATOSIS: ICD-10-CM

## 2025-08-12 DIAGNOSIS — L57.0 AK (ACTINIC KERATOSIS): Primary | ICD-10-CM

## 2025-08-12 RX ORDER — CICLOPIROX OLAMINE 7.7 MG/G
CREAM TOPICAL
Qty: 30 G | Refills: 0 | Status: SHIPPED | OUTPATIENT
Start: 2025-08-12